# Patient Record
Sex: FEMALE | Race: WHITE | NOT HISPANIC OR LATINO | URBAN - METROPOLITAN AREA
[De-identification: names, ages, dates, MRNs, and addresses within clinical notes are randomized per-mention and may not be internally consistent; named-entity substitution may affect disease eponyms.]

---

## 2023-02-20 ENCOUNTER — EMERGENCY (EMERGENCY)
Facility: HOSPITAL | Age: 28
LOS: 1 days | Discharge: HOME | End: 2023-02-20
Attending: EMERGENCY MEDICINE
Payer: COMMERCIAL

## 2023-02-20 VITALS
SYSTOLIC BLOOD PRESSURE: 129 MMHG | DIASTOLIC BLOOD PRESSURE: 77 MMHG | RESPIRATION RATE: 16 BRPM | TEMPERATURE: 98 F | HEART RATE: 123 BPM | OXYGEN SATURATION: 99 %

## 2023-02-20 VITALS
SYSTOLIC BLOOD PRESSURE: 98 MMHG | DIASTOLIC BLOOD PRESSURE: 56 MMHG | RESPIRATION RATE: 26 BRPM | OXYGEN SATURATION: 97 % | HEART RATE: 111 BPM

## 2023-02-20 DIAGNOSIS — F43.25 ADJUSTMENT DISORDER WITH MIXED DISTURBANCE OF EMOTIONS AND CONDUCT: ICD-10-CM

## 2023-02-20 LAB
ALBUMIN SERPL ELPH-MCNC: 4 G/DL — SIGNIFICANT CHANGE UP (ref 3.3–5.2)
ALP SERPL-CCNC: 79 U/L — SIGNIFICANT CHANGE UP (ref 40–120)
ALT FLD-CCNC: 31 U/L — SIGNIFICANT CHANGE UP
AMPHET UR-MCNC: NEGATIVE — SIGNIFICANT CHANGE UP
ANION GAP SERPL CALC-SCNC: 11 MMOL/L — SIGNIFICANT CHANGE UP (ref 5–17)
APAP SERPL-MCNC: <3 UG/ML — LOW (ref 10–26)
APPEARANCE UR: CLEAR — SIGNIFICANT CHANGE UP
AST SERPL-CCNC: 26 U/L — SIGNIFICANT CHANGE UP
BACTERIA # UR AUTO: ABNORMAL
BARBITURATES UR SCN-MCNC: NEGATIVE — SIGNIFICANT CHANGE UP
BASOPHILS # BLD AUTO: 0.06 K/UL — SIGNIFICANT CHANGE UP (ref 0–0.2)
BASOPHILS NFR BLD AUTO: 1.1 % — SIGNIFICANT CHANGE UP (ref 0–2)
BENZODIAZ UR-MCNC: NEGATIVE — SIGNIFICANT CHANGE UP
BILIRUB SERPL-MCNC: <0.2 MG/DL — LOW (ref 0.4–2)
BILIRUB UR-MCNC: NEGATIVE — SIGNIFICANT CHANGE UP
BUN SERPL-MCNC: 6 MG/DL — LOW (ref 8–20)
CALCIUM SERPL-MCNC: 8.3 MG/DL — LOW (ref 8.4–10.5)
CHLORIDE SERPL-SCNC: 105 MMOL/L — SIGNIFICANT CHANGE UP (ref 96–108)
CO2 SERPL-SCNC: 22 MMOL/L — SIGNIFICANT CHANGE UP (ref 22–29)
COCAINE METAB.OTHER UR-MCNC: NEGATIVE — SIGNIFICANT CHANGE UP
COLOR SPEC: YELLOW — SIGNIFICANT CHANGE UP
CREAT SERPL-MCNC: 0.85 MG/DL — SIGNIFICANT CHANGE UP (ref 0.5–1.3)
DIFF PNL FLD: ABNORMAL
EGFR: 96 ML/MIN/1.73M2 — SIGNIFICANT CHANGE UP
EOSINOPHIL # BLD AUTO: 0.14 K/UL — SIGNIFICANT CHANGE UP (ref 0–0.5)
EOSINOPHIL NFR BLD AUTO: 2.5 % — SIGNIFICANT CHANGE UP (ref 0–6)
EPI CELLS # UR: SIGNIFICANT CHANGE UP
ETHANOL SERPL-MCNC: 149 MG/DL — HIGH (ref 0–9)
FLUAV AG NPH QL: SIGNIFICANT CHANGE UP
FLUBV AG NPH QL: SIGNIFICANT CHANGE UP
GLUCOSE SERPL-MCNC: 139 MG/DL — HIGH (ref 70–99)
GLUCOSE UR QL: NEGATIVE MG/DL — SIGNIFICANT CHANGE UP
HCG SERPL-ACNC: <4 MIU/ML — SIGNIFICANT CHANGE UP
HCT VFR BLD CALC: 39.4 % — SIGNIFICANT CHANGE UP (ref 34.5–45)
HGB BLD-MCNC: 13.2 G/DL — SIGNIFICANT CHANGE UP (ref 11.5–15.5)
IMM GRANULOCYTES NFR BLD AUTO: 1.1 % — HIGH (ref 0–0.9)
KETONES UR-MCNC: NEGATIVE — SIGNIFICANT CHANGE UP
LEUKOCYTE ESTERASE UR-ACNC: NEGATIVE — SIGNIFICANT CHANGE UP
LYMPHOCYTES # BLD AUTO: 2.02 K/UL — SIGNIFICANT CHANGE UP (ref 1–3.3)
LYMPHOCYTES # BLD AUTO: 35.4 % — SIGNIFICANT CHANGE UP (ref 13–44)
MCHC RBC-ENTMCNC: 30.7 PG — SIGNIFICANT CHANGE UP (ref 27–34)
MCHC RBC-ENTMCNC: 33.5 GM/DL — SIGNIFICANT CHANGE UP (ref 32–36)
MCV RBC AUTO: 91.6 FL — SIGNIFICANT CHANGE UP (ref 80–100)
METHADONE UR-MCNC: NEGATIVE — SIGNIFICANT CHANGE UP
MONOCYTES # BLD AUTO: 0.61 K/UL — SIGNIFICANT CHANGE UP (ref 0–0.9)
MONOCYTES NFR BLD AUTO: 10.7 % — SIGNIFICANT CHANGE UP (ref 2–14)
NEUTROPHILS # BLD AUTO: 2.81 K/UL — SIGNIFICANT CHANGE UP (ref 1.8–7.4)
NEUTROPHILS NFR BLD AUTO: 49.2 % — SIGNIFICANT CHANGE UP (ref 43–77)
NITRITE UR-MCNC: NEGATIVE — SIGNIFICANT CHANGE UP
OPIATES UR-MCNC: NEGATIVE — SIGNIFICANT CHANGE UP
PCP SPEC-MCNC: SIGNIFICANT CHANGE UP
PCP UR-MCNC: NEGATIVE — SIGNIFICANT CHANGE UP
PH UR: 6.5 — SIGNIFICANT CHANGE UP (ref 5–8)
PLATELET # BLD AUTO: 280 K/UL — SIGNIFICANT CHANGE UP (ref 150–400)
POTASSIUM SERPL-MCNC: 3.6 MMOL/L — SIGNIFICANT CHANGE UP (ref 3.5–5.3)
POTASSIUM SERPL-SCNC: 3.6 MMOL/L — SIGNIFICANT CHANGE UP (ref 3.5–5.3)
PROT SERPL-MCNC: 6.5 G/DL — LOW (ref 6.6–8.7)
PROT UR-MCNC: 15
RBC # BLD: 4.3 M/UL — SIGNIFICANT CHANGE UP (ref 3.8–5.2)
RBC # FLD: 12.1 % — SIGNIFICANT CHANGE UP (ref 10.3–14.5)
RBC CASTS # UR COMP ASSIST: ABNORMAL /HPF (ref 0–4)
RSV RNA NPH QL NAA+NON-PROBE: SIGNIFICANT CHANGE UP
SALICYLATES SERPL-MCNC: <0.6 MG/DL — LOW (ref 10–20)
SARS-COV-2 RNA SPEC QL NAA+PROBE: SIGNIFICANT CHANGE UP
SODIUM SERPL-SCNC: 138 MMOL/L — SIGNIFICANT CHANGE UP (ref 135–145)
SP GR SPEC: 1.01 — SIGNIFICANT CHANGE UP (ref 1.01–1.02)
THC UR QL: NEGATIVE — SIGNIFICANT CHANGE UP
UROBILINOGEN FLD QL: NEGATIVE MG/DL — SIGNIFICANT CHANGE UP
WBC # BLD: 5.7 K/UL — SIGNIFICANT CHANGE UP (ref 3.8–10.5)
WBC # FLD AUTO: 5.7 K/UL — SIGNIFICANT CHANGE UP (ref 3.8–10.5)
WBC UR QL: SIGNIFICANT CHANGE UP /HPF (ref 0–5)

## 2023-02-20 PROCEDURE — 80053 COMPREHEN METABOLIC PANEL: CPT

## 2023-02-20 PROCEDURE — 99053 MED SERV 10PM-8AM 24 HR FAC: CPT

## 2023-02-20 PROCEDURE — 96361 HYDRATE IV INFUSION ADD-ON: CPT

## 2023-02-20 PROCEDURE — 99285 EMERGENCY DEPT VISIT HI MDM: CPT | Mod: 25

## 2023-02-20 PROCEDURE — 84702 CHORIONIC GONADOTROPIN TEST: CPT

## 2023-02-20 PROCEDURE — 99285 EMERGENCY DEPT VISIT HI MDM: CPT

## 2023-02-20 PROCEDURE — 81001 URINALYSIS AUTO W/SCOPE: CPT

## 2023-02-20 PROCEDURE — 93010 ELECTROCARDIOGRAM REPORT: CPT

## 2023-02-20 PROCEDURE — 90792 PSYCH DIAG EVAL W/MED SRVCS: CPT

## 2023-02-20 PROCEDURE — 80307 DRUG TEST PRSMV CHEM ANLYZR: CPT

## 2023-02-20 PROCEDURE — 36415 COLL VENOUS BLD VENIPUNCTURE: CPT

## 2023-02-20 PROCEDURE — 87637 SARSCOV2&INF A&B&RSV AMP PRB: CPT

## 2023-02-20 PROCEDURE — 96360 HYDRATION IV INFUSION INIT: CPT

## 2023-02-20 PROCEDURE — 93005 ELECTROCARDIOGRAM TRACING: CPT

## 2023-02-20 PROCEDURE — 85025 COMPLETE CBC W/AUTO DIFF WBC: CPT

## 2023-02-20 RX ORDER — SODIUM CHLORIDE 9 MG/ML
1000 INJECTION INTRAMUSCULAR; INTRAVENOUS; SUBCUTANEOUS ONCE
Refills: 0 | Status: COMPLETED | OUTPATIENT
Start: 2023-02-20 | End: 2023-02-20

## 2023-02-20 RX ORDER — DULOXETINE HYDROCHLORIDE 30 MG/1
60 CAPSULE, DELAYED RELEASE ORAL DAILY
Refills: 0 | Status: DISCONTINUED | OUTPATIENT
Start: 2023-02-20 | End: 2023-02-27

## 2023-02-20 RX ADMIN — DULOXETINE HYDROCHLORIDE 60 MILLIGRAM(S): 30 CAPSULE, DELAYED RELEASE ORAL at 10:53

## 2023-02-20 RX ADMIN — SODIUM CHLORIDE 1000 MILLILITER(S): 9 INJECTION INTRAMUSCULAR; INTRAVENOUS; SUBCUTANEOUS at 03:15

## 2023-02-20 RX ADMIN — SODIUM CHLORIDE 1000 MILLILITER(S): 9 INJECTION INTRAMUSCULAR; INTRAVENOUS; SUBCUTANEOUS at 09:02

## 2023-02-20 RX ADMIN — SODIUM CHLORIDE 1000 MILLILITER(S): 9 INJECTION INTRAMUSCULAR; INTRAVENOUS; SUBCUTANEOUS at 07:44

## 2023-02-20 NOTE — ED ADULT NURSE NOTE - CHIEF COMPLAINT QUOTE
Brought to ED  s/p taking "a hand full of Seroquel" and drinking alcohol. Pt states that her and her  got into a fight, so she went into the bathroom at approximately 02:10 and took the Seroquel, which are 50mg pills. Pt endorses that this was an attempt to hurt herself. States that she feels "tired." Pt changed into yellow gown, belongings secured and 1:1 SNA at bedside.

## 2023-02-20 NOTE — ED BEHAVIORAL HEALTH ASSESSMENT NOTE - NSBHSACONSEQUENCE_PSY_A_CORE FT
acknowledges that worsens her mood but does not feel that it's a problem, no prior h/o detox's or rehabs, or complicated withdrawal.

## 2023-02-20 NOTE — ED BEHAVIORAL HEALTH ASSESSMENT NOTE - RISK ASSESSMENT
Low:  Patient presents w/ impulsive overdose in context of argument and ETOH intoxication, denies any S/H I/I/P, engaged in tx, future oriented, high rescue/low lethality attempt/gestures, patient reports remorse, good support, h/o occaisional binge drinking,

## 2023-02-20 NOTE — ED BEHAVIORAL HEALTH ASSESSMENT NOTE - CURRENT MEDICATION
Cymbalta 60 mg twice daily, Quetiapine 25 mg at night PRN insomnia (reports she does not typically take)

## 2023-02-20 NOTE — ED PROVIDER NOTE - NSFOLLOWUPINSTRUCTIONS_ED_ALL_ED_FT
Intentional Drug Overdose    A prescription pill bottle with an example of a pill.   An intentional drug overdose refers to taking too much of a drug to get high or for the purpose of harming yourself. An overdose can occur with illegal drugs, prescription medicines, or over-the-counter (OTC) medicines.    The effects of an intentional drug overdose can be mild, dangerous, or even deadly.      What are the causes?    A drug overdose is caused by taking too much of a drug. Drugs that commonly cause overdose include:  •Pain medicines.      •Medicines to treat mental health conditions.      •Illegal drugs.        What increases the risk?    The risk of a drug overdose is higher for someone who:  •Takes illegal drugs.      •Takes a drug and drinks alcohol.      •Takes multiple drugs.      •Has a mental health condition.        What are the signs or symptoms?    Symptoms of a drug overdose depend on the drug and the amount that was taken. Common danger signs include:  •Behavior changes.      •Sleepiness.      •Slowed breathing.      •Nausea and vomiting.      •Seizures.      •Changes in eye pupil size. The pupil may be very large or very small.      If there are signs of very low blood pressure (shock) from an overdose, emergency treatment is required. These signs include:  •Cold and clammy skin.      •Pale skin.      •Blue lips.      •Very slow breathing.      •Extreme sleepiness.      •Loss of consciousness.        How is this diagnosed?    This condition may be diagnosed based on your symptoms. It is important to tell your health care provider:  •All of the drugs that you took.      •When you took the drugs.      •Whether you were drinking alcohol.      Your health care provider will do a physical exam. This exam may include:  •Checking and monitoring your heart rate and rhythm, your temperature, and your blood pressure (vital signs).      •Checking your breathing and oxygen level.      You may also have tests, including urine or blood tests.      How is this treated?    This condition requires immediate medical treatment and hospitalization. Treatment will focus on supporting normal body functions, such as breathing, and preventing complications. Treatment may include:  •Giving fluids and electrolytes through an IV.      •Inserting a breathing tube in your airway to help you breathe.      •Passing a tube through your nose and into your stomach (nasogastric tube, NG tube) to wash out your stomach.      •Filtering your blood through an artificial kidney machine (hemodialysis).      •Ongoing counseling and mental health support.    •Giving medicines that:  •Make you vomit.      •Absorb any medicine that is left in your digestive system.      •Block or reverse the effect of the drug that caused the overdose (antidote).          Follow these instructions at home:    Medicines     •Take over-the-counter and prescription medicines only as told by your health care provider. Always ask your health care provider about possible side effects of any new drug that you start taking.      •Keep a list of all the drugs that you take, including over-the-counter medicines. Bring this list with you to all your medical visits.      Alcohol use   • Do not drink alcohol if:  •Your health care provider tells you not to drink.      •You are pregnant, may be pregnant, or are planning to become pregnant.      •If you drink alcohol: •Limit how much you have to:  •0–1 drink a day for women.      •0–2 drinks a day for men.        •Know how much alcohol is in your drink. In the [place] one drink equals one 12 oz bottle of beer (355 mL), one 5 oz glass of wine (148 mL), or one 1½ oz glass of hard liquor (44 mL).          General instructions   A comparison of three sample cups showing dark yellow, yellow, and pale yellow urine.    •Drink enough fluid to keep your urine pale yellow.      •If you are working with a counselor or mental health professional, make sure to follow his or her instructions.      •Keep all follow-up visits. This is important.        How is this prevented?  •Get help if you are struggling with:  •Alcohol or drug use.      •Depression or another mental health problem.        •Keep the phone number of your local poison control center near your phone or on your mobile phone.      •Read the drug inserts that come with your medicines.      • Do not use illegal drugs.      • Do not drink alcohol when taking drugs.      • Do not take medicines that are not prescribed for you.        Where to find support    If you think that you are addicted to a drug or that you have a problem with drug use, you may benefit from receiving support for quitting from a local support group or counselor. Ask your health care provider for a referral to these resources in your area.      Where to find more information    •Substance Abuse and Mental Health Services Administration (SAMHSA): www.samhsa.gov      •National [place] on Mental Illness (CEZAR): www.cezar.org        Contact a health care provider if:    •Your symptoms return.      •You develop new symptoms or side effects when you take medicines.        Get help right away if:    •You think that you or someone else may have taken too much of a drug. The American Association of Poison Control Centers hotline is 1-631.278.3956.      •You or someone else is having symptoms of a drug overdose.      •You become confused.    •You have:  •Chest pain.      •Trouble breathing.      •A loss of consciousness.        •You have serious thoughts about hurting yourself or others.      Drug overdose is an emergency. Do not wait to see if the symptoms will go away. Get medical help right away. Call your local emergency services (268 in the [place] Do not drive yourself to the hospital.     If you ever feel like you may hurt yourself or others, or have thoughts about taking your own life, get help right away. Go to your nearest emergency department or:   • Call your local emergency services (390 in the [place]       • Call a suicide crisis helpline, such as the National Suicide Prevention Lifeline at 1-842.266.1864 or 549 in the [place] This is open 24 hours a day in the [place]       • Text the Crisis Text Line at 917613 (in the [place]         Summary    •A drug overdose happens when you take too much of a drug.      •An overdose can occur with illegal drugs, prescription medicines, or over-the-counter (OTC) medicines.      •This condition requires immediate medical treatment and hospitalization.      This information is not intended to replace advice given to you by your health care provider. Make sure you discuss any questions you have with your health care provider.

## 2023-02-20 NOTE — ED PROVIDER NOTE - PHYSICAL EXAMINATION
VITAL SIGNS: I have reviewed vital signs +TACHYCARDIA  CONSTITUTIONAL:  recently crying   SKIN: Warm, dry, no rash.  HEAD: Normocephalic, atraumatic.  EYES: PERRL, conjunctiva and sclera clear.  ENT: pink & moist mucosa, no pharyngeal erythema  NECK: Supple, non tender. No cervical lymphadenopathy.  CARD: +TACHY  RESP: No wheezes, rales or rhonchi. CTAB  ABD:  soft, non-distended, non-tender.   MSK:  Good ROM in upper/lower extremities w/o pain.   NEURO: Alert, oriented. Grossly unremarkable. No focal deficits.

## 2023-02-20 NOTE — CONSULT NOTE ADULT - ASSESSMENT
Patient is a 27 year old female with PMH insomnia on Seroquel who presents w/ intentional Seroquel overdose.     1. Quetiapine Overdose  - patient ingested an unknown amount of quetiapine prior to arrival  - patient sedated and mildly tachycardic, but not exhibiting antimuscarinic signs  - follow-up EKG, labs, acetaminophen level, salicylate level, and alcohol level  - quetiapine can cause hypotension, tachycardia, sedation, antimuscarinic symptoms, and QT prolongation in overdose  - observe for 6 hours from time of exposure -- if patient exhibits signs of toxicity, will then require prolonged observation until back to clinical baseline

## 2023-02-20 NOTE — ED BEHAVIORAL HEALTH ASSESSMENT NOTE - DESCRIPTION
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 She appeared forthcoming. BAL on initial presentation was 149.  At times of interview was calculated below 100.  She denied any S/H I/I/P.  Speech was slurred at times.   She did not exhibit signs or symptoms of withdrawal.     Vital Signs Last 24 Hrs  T(C): 36.8 (02-20-23 @ 07:27), Max: 36.9 (02-20-23 @ 02:57)  T(F): 98.2 (02-20-23 @ 07:27), Max: 98.4 (02-20-23 @ 02:57)  HR: 126 (02-20-23 @ 07:27) (123 - 128)  BP: 100/57 (02-20-23 @ 07:27) (100/57 - 129/77)  BP(mean): --  RR: 18 (02-20-23 @ 07:27) (16 - 18)  SpO2: 97% (02-20-23 @ 07:27) (97% - 99%) fibromyalgia, endometriosis

## 2023-02-20 NOTE — ED PROVIDER NOTE - PATIENT PORTAL LINK FT
You can access the FollowMyHealth Patient Portal offered by [facility] by registering at the following website: [email or web address]. By joining [employer]’s FollowMyHealth portal, you will also be able to view your health information using other applications (apps) compatible with our system.

## 2023-02-20 NOTE — ED ADULT NURSE NOTE - OBJECTIVE STATEMENT
Pt brought to ED by  s/p overdose and attempted suicide. Pt admits to etoh use today and to taking a "handful" of 50mg Seroquel IR pills. Pt is awake but drowsy, answering questions appropriately and maintaining her airway. She states that she was attempting to hurt herself when she took the pills, but currently denies SI/HI. 1:1 at bedside.

## 2023-02-20 NOTE — ED ADULT NURSE NOTE - NSIMPLEMENTINTERV_GEN_ALL_ED
Implemented All Fall Risk Interventions:  Ralph to call system. Call bell, personal items and telephone within reach. Instruct patient to call for assistance. Room bathroom lighting operational. Non-slip footwear when patient is off stretcher. Physically safe environment: no spills, clutter or unnecessary equipment. Stretcher in lowest position, wheels locked, appropriate side rails in place. Provide visual cue, wrist band, yellow gown, etc. Monitor gait and stability. Monitor for mental status changes and reorient to person, place, and time. Review medications for side effects contributing to fall risk. Reinforce activity limits and safety measures with patient and family.

## 2023-02-20 NOTE — ED PROVIDER NOTE - PROGRESS NOTE DETAILS
: patient seen by psych, cleared for discharge.  when sleeping, increased to 120s when talking. patient report baseline tachycardia around 120s and has seen cardiology in the past. patient is due for her cymbalta. meds ordered. : HR improved. patient AO x 3. comfortable going home.

## 2023-02-20 NOTE — ED BEHAVIORAL HEALTH ASSESSMENT NOTE - SAFETY PLAN ADDT'L DETAILS
Safety plan discussed with.../Education provided regarding environmental safety / lethal means restriction/Provision of National Suicide Prevention Lifeline 4-578-036-RCSW (8058)

## 2023-02-20 NOTE — ED ADULT TRIAGE NOTE - CHIEF COMPLAINT QUOTE
Brought to ED  s/p taking "a hand full of Seroquel" and drinking alcohol. Pt states that her and her  got into a fight, so she went into the bathroom and took the Seroquel, which are 50mg pills. Pt endorses that this was an attempt to hurt herself. States that she feels "tired." Brought to ED  s/p taking "a hand full of Seroquel" and drinking alcohol. Pt states that her and her  got into a fight, so she went into the bathroom at approximately 02:10 and took the Seroquel, which are 50mg pills. Pt endorses that this was an attempt to hurt herself. States that she feels "tired." Pt changed into yellow gown, belongings secured and 1:1 SNA at bedside.

## 2023-02-20 NOTE — ED BEHAVIORAL HEALTH ASSESSMENT NOTE - HPI (INCLUDE ILLNESS QUALITY, SEVERITY, DURATION, TIMING, CONTEXT, MODIFYING FACTORS, ASSOCIATED SIGNS AND SYMPTOMS)
Patient is a 27  year old, female; domiciled with  ( in August of last year but dating for 11 years prior) employed as a respiratory therapist; with past psychiatric history of depression and anxiety ; no psychiatric  hospitalizations; reports one prior interrupted suicide attempt/gesture at age 15;  no known history of violence or arrests;  h/o occasional binge drinking of ETOH  known history of complicated withdrawal;  denies h/o prior rehabs or detox's, with PMH of endometriosis and fibromyalgia ; brought in by  after overdosing on Quetiapine in context of ETOH intoxication and verbal argument w/ .    Patient reports long h/o struggle with anxiety and depressive sx's and intermittent passive suicidal ideation. She has been in treatment with psychiatric NP (Zandra Reynoso) for the last 6 months and therapist (Luci Gamez) for last two years approximately. She was started on Cymbalta approximately 6 months ago which was increased to 60 mg twice daily a little over a  month ago. She noted that Cymbalta has been helpful. Of note, she was not able to take medications for one week but restarted last monday. She feels that the medication has been helpful.  She describes stress related to her attempts to have a child with her  which have been unsuccessful so far. She also reports increase in pain due to her endometriosis last over the last week. She has also recently stopped her OCP's in attempts to become pregnant.         On interview, patient was initially sleeping but easily arousable.  She was still drowsy with slightly slurred speech but appeared forthcoming on interview.  Her BAL was at 149 at 3:00 am.  Patient reportedly went out with friends at night and came home intoxicated on ETOH. She got into an argument with her  (she does not remember content of argument) and impulsively locked herself in the bathroom and took a handful of Quetiapine pills.  She denies clear suicidal intent. Her  who was present forced himself in bathroom and tried to maker her vomit and then brought her to ER for further evaluation.      Patient was seen by toxicology, although sedated, does not show signs of toxicity.  Patient reports she has been functioning at work, compliant with her medications, and overall good relationship with her .  Her depression has been up and down for the last two weeks but denies consistently depressed mood. She denies disturbance in sleep, appetite, energy or any anhedonia. She reports her passive suicidal thoughts resolved when Cymbalta increased.  She reports over the last week she has some thoughts but without any intent or plan. She attributes events of last night to alcohol use, and pain.  She denies current S/H I/I/P.  She reports she feels safe going home. Voluntary hospitalization was explored and patient does not feel that there was a need. She does not feel that she would repeat behavors.  She remains future oriented.     Concerning other psychiatric symptoms, pt any aggressive or violent behavior towards others. Pt denies any episodes of bizarre happiness, unusual energy, unusual nightime excitation or other common symptoms of yvette. Pt denies hearing voices or seeing things.  No delusions were elicited.  Patient reports she worries and has anxiety at times but overall has been managing and denies  panic attacks, obsessions or compulsions.      She reports she occasionally drinks excessively when in a social situation with friends, and reports she drank excessively last night. This occurs 1-2 times a month (as per patient and ). She does not feel that it is generally a problem.     Writer spoke with  who was at bedside.  He corroborated accounts documented above.  He reports that they got into an argument over something insignificant.  He does feel that alcohol was a significant contributor to her presentation.  He reported when she drinks patient gets much worse, but in general has been coping with anxiety and depression. He states she has not done anything similar before while they have been together.  He feels that she is safe to go home and does not feel that she would repeat actions.

## 2023-02-20 NOTE — ED BEHAVIORAL HEALTH ASSESSMENT NOTE - SUMMARY
Patient is a 27  year old, female; domiciled with  ( in August of last year but dating for 11 years prior) employed as a respiratory therapist; with past psychiatric history of depression and anxiety ; no psychiatric  hospitalizations; reports one prior interrupted suicide attempt/gesture at age 15;  no known history of violence or arrests;  h/o occasional binge drinking of ETOH  known history of complicated withdrawal;  denies h/o prior rehabs or detox's, with PMH of endometriosis and fibromyalgia ; brought in by  after overdosing on Quetiapine in context of ETOH intoxication and verbal argument w/ .  Patient reportedly took around 10 pills of Quetiapine 25 mg with unclear intent.  Alcohol use and verbal argument are clear precipitants.  She denies current S/H I/I/P.  Reports stress related to trying to get pregnant and pain due to endometriosis of last week. Taking pills were not premeditated, and rescue was high (taken during argument with ).  does also feel her intoxicated state was a clear factor and does not have safety concerns about her return home.  No psychotic, or manic sx's were elicited.  Patient has been compliant with tx and functioning well at work. Will clear for outpatient follow up.

## 2023-02-20 NOTE — ED ADULT NURSE REASSESSMENT NOTE - NS ED NURSE REASSESS COMMENT FT1
pt reports hx of tachycardia states she sees cardiologist for it and that her heart rate is normally 115

## 2023-02-20 NOTE — ED PROVIDER NOTE - OBJECTIVE STATEMENT
ANNA CRAWFORD is a 28yo Female with PMH insomnia on Seroquel who presents w/ intentional Seroquel overdose. States she was drinking alcohol tonight and got into a fight with her . She went into the bathroom and took a handful of Seroquel which she is prescribed to help her sleep. She is unable to quantify how many she took other than to say that it was "a handful". She states the bottle was full because she does not use it regularly. She denies taking any other medications tonight. She admits she was trying to hurt herself. She denies any symptoms right now specifically denying any pain, chest pain, palpitations, nausea, vomiting, abdominal pain. ANNA CRAWFORD is a 28yo Female with PMH insomnia on Seroquel who presents w/ intentional Seroquel overdose. States she was drinking alcohol tonight and got into a fight with her . She went into the bathroom and took a handful of Seroquel which she is prescribed to help her sleep. She thinks they were instant release. She is unable to quantify how many she took other than to say that it was "a handful". She states the bottle was full because she does not use it regularly. She denies taking any other medications tonight. She admits she was trying to hurt herself. She denies any symptoms right now specifically denying any pain, chest pain, palpitations, nausea, vomiting, abdominal pain.

## 2023-02-20 NOTE — ED ADULT NURSE REASSESSMENT NOTE - NS ED NURSE REASSESS COMMENT FT1
assumed care of patient 0720 charting as noted. pt alert and oriented x4, , denies current si/hi. pt states she wants to leave AMA. explained she needs to wait for psychiatry. NS hung. 1:1 at bedside. ekg obtained for tachycardia

## 2023-02-20 NOTE — ED ADULT NURSE REASSESSMENT NOTE - NS ED NURSE REASSESS COMMENT FT1
sleeping in bedside, constant observation remains in place,  at bedside, no distress, Normal Sinus Rhythm on cardiac monitor

## 2023-02-20 NOTE — ED PROVIDER NOTE - NS ED ROS FT
Review of Systems  CONSTITUTIONAL: afebrile w/no diaphoresis or weight changes  SKIN: warm, dry w/ no rash or bleeding  EYES: no changes to vision  ENT: no changes in hearing, no sore throat  RESPIRATORY: no cough or SOB  CARDIAC: no chest pain & no palpitations  GI: no abd pain, nausea, vomiting, diarrhea, constipation, or blood in stool/felipa blood  GENITO-URINARY: no discharge, dysuria or hematuria,   MUSCULOSKELETAL:  no joint pain, swelling or redness  NEUROLOGIC: no weakness, headache, anesthesia or paresthesias  PSYCH: no anxiety, non suicidal, non homicidal, without hallucinations or depression

## 2023-02-20 NOTE — CONSULT NOTE ADULT - SUBJECTIVE AND OBJECTIVE BOX
MEDICAL TOXICOLOGY CONSULT    HPI:  Patient is a 27 year old female with PMH insomnia on Seroquel who presents w/ intentional Seroquel overdose. States she was drinking alcohol tonight and got into a fight with her . She went into the bathroom and took a handful of Seroquel which she is prescribed to help her sleep. She thinks they were instant release. She is unable to quantify how many she took other than to say that it was "a handful". She states the bottle was full because she does not use it regularly. She denies taking any other medications tonight. She admits she was trying to hurt herself. She denies any symptoms right now specifically denying any pain, chest pain, palpitations, nausea, vomiting, abdominal pain.  Medical toxicology consulted for further recommendations.    PAST MEDICAL & SURGICAL HISTORY:      MEDICATION HISTORY:      FAMILY HISTORY: noncontributory      Vital Signs Last 24 Hrs  T(C): 36.8 (2023 07:27), Max: 36.9 (2023 02:57)  T(F): 98.2 (2023 07:27), Max: 98.4 (2023 02:57)  HR: 126 (2023 07:27) (123 - 128)  BP: 100/57 (2023 07:27) (100/57 - 129/77)  BP(mean): --  RR: 18 (2023 07:27) (16 - 18)  SpO2: 97% (2023 07:27) (97% - 99%)    Parameters below as of 2023 03:17  Patient On (Oxygen Delivery Method): room air        SIGNIFICANT LABORATORY STUDIES:                        13.2   5.70  )-----------( 280      ( 2023 03:00 )             39.4       02-20    138  |  105  |  6.0<L>  ----------------------------<  139<H>  3.6   |  22.0  |  0.85    Ca    8.3<L>      2023 03:00    TPro  6.5<L>  /  Alb  4.0  /  TBili  <0.2<L>  /  DBili  x   /  AST  26  /  ALT  31  /  AlkPhos  79  -          Urinalysis Basic - ( 2023 04:00 )    Color: Yellow / Appearance: Clear / S.010 / pH: x  Gluc: x / Ketone: Negative  / Bili: Negative / Urobili: Negative mg/dL   Blood: x / Protein: 15 / Nitrite: Negative   Leuk Esterase: Negative / RBC: 3-5 /HPF / WBC 0-2 /HPF   Sq Epi: x / Non Sq Epi: Occasional / Bacteria: Occasional        Anion Gap: 11  @ 03:00  CK: --  @ 03:00  Troponin:  --   @ 03:00  Pro-BNP:  --   @ 03:00  VBG:  --   @ 03:00  Carboxyhemoglobin %:  --   @ 03:00  Methemoglobin %:  --   @ 03:00  Osmolality Serum:  --   @ 03:00  Aspirin Level: <0.6<L>   @ 03:00  Acetaminophen Level:  <3.0<L>   @ 03:00  Ethanol Level:  --   03:00  Digoxin Level:  --   03:00  Phenytoin Level:  --  :00  Carbamazepine level:  --   @ :00  Lamotrigine level:  --   @ 03:00
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
## 2023-02-20 NOTE — ED PROVIDER NOTE - ATTENDING CONTRIBUTION TO CARE
Intentional overdose of seroquel with associated intoxication, mental status intact, ECG sinus with normal intervals, no other associated coingestion. Will obs on tele for 6 hours to monitor for rhythm disturbances and respiratory depression. Plan for psychiatry eval for risk assessment and discharge planning if medically clear.

## 2025-01-16 NOTE — ED BEHAVIORAL HEALTH ASSESSMENT NOTE - SUICIDALITY
Called and LVM for pt. Advised that we have not received her Ozempic shipment from [facility]. Will route to Rx coordinators for assistance.    Diaz Mathis, RommelD, BCACP     No